# Patient Record
Sex: MALE | Race: BLACK OR AFRICAN AMERICAN | Employment: OTHER | URBAN - METROPOLITAN AREA
[De-identification: names, ages, dates, MRNs, and addresses within clinical notes are randomized per-mention and may not be internally consistent; named-entity substitution may affect disease eponyms.]

---

## 2019-05-01 ENCOUNTER — HOSPITAL ENCOUNTER (EMERGENCY)
Age: 74
Discharge: HOME | End: 2019-05-01
Attending: EMERGENCY MEDICINE
Payer: MEDICARE

## 2019-05-01 VITALS
WEIGHT: 179.9 LBS | TEMPERATURE: 97.8 F | OXYGEN SATURATION: 100 % | HEIGHT: 72 IN | BODY MASS INDEX: 24.37 KG/M2 | HEART RATE: 93 BPM | SYSTOLIC BLOOD PRESSURE: 87 MMHG | RESPIRATION RATE: 12 BRPM | DIASTOLIC BLOOD PRESSURE: 57 MMHG

## 2019-05-01 DIAGNOSIS — T82.524A DISPLACEMENT OF PERIPHERALLY INSERTED CENTRAL CATHETER (PICC) (HCC): Primary | ICD-10-CM

## 2019-05-01 DIAGNOSIS — Z51.5 HOSPICE CARE PATIENT: ICD-10-CM

## 2019-05-01 DIAGNOSIS — T82.838A BLEEDING FROM PERIPHERALLY INSERTED CENTRAL VENOUS CATHETER (PICC), INITIAL ENCOUNTER: ICD-10-CM

## 2019-05-01 LAB
ALBUMIN SERPL-MCNC: 2.5 G/DL (ref 3.5–5)
ALBUMIN/GLOB SERPL: 0.6 {RATIO} (ref 1.1–2.2)
ALP SERPL-CCNC: 190 U/L (ref 45–117)
ALT SERPL-CCNC: 8 U/L (ref 12–78)
ANION GAP SERPL CALC-SCNC: 14 MMOL/L (ref 5–15)
AST SERPL-CCNC: 31 U/L (ref 15–37)
BASOPHILS # BLD: 0 K/UL (ref 0–0.1)
BASOPHILS NFR BLD: 0 % (ref 0–1)
BILIRUB SERPL-MCNC: 1.4 MG/DL (ref 0.2–1)
BUN SERPL-MCNC: 149 MG/DL (ref 6–20)
BUN/CREAT SERPL: 38 (ref 12–20)
CALCIUM SERPL-MCNC: 8.6 MG/DL (ref 8.5–10.1)
CHLORIDE SERPL-SCNC: 106 MMOL/L (ref 97–108)
CO2 SERPL-SCNC: 23 MMOL/L (ref 21–32)
CREAT SERPL-MCNC: 3.88 MG/DL (ref 0.7–1.3)
DIFFERENTIAL METHOD BLD: ABNORMAL
EOSINOPHIL # BLD: 0 K/UL (ref 0–0.4)
EOSINOPHIL NFR BLD: 0 % (ref 0–7)
ERYTHROCYTE [DISTWIDTH] IN BLOOD BY AUTOMATED COUNT: 17.6 % (ref 11.5–14.5)
GLOBULIN SER CALC-MCNC: 3.9 G/DL (ref 2–4)
GLUCOSE SERPL-MCNC: 98 MG/DL (ref 65–100)
HCT VFR BLD AUTO: 23.4 % (ref 36.6–50.3)
HGB BLD-MCNC: 7.7 G/DL (ref 12.1–17)
IMM GRANULOCYTES # BLD AUTO: 0.1 K/UL (ref 0–0.04)
IMM GRANULOCYTES NFR BLD AUTO: 1 % (ref 0–0.5)
LYMPHOCYTES # BLD: 0.3 K/UL (ref 0.8–3.5)
LYMPHOCYTES NFR BLD: 3 % (ref 12–49)
MCH RBC QN AUTO: 31.3 PG (ref 26–34)
MCHC RBC AUTO-ENTMCNC: 32.9 G/DL (ref 30–36.5)
MCV RBC AUTO: 95.1 FL (ref 80–99)
MONOCYTES # BLD: 1.1 K/UL (ref 0–1)
MONOCYTES NFR BLD: 13 % (ref 5–13)
NEUTS SEG # BLD: 7.1 K/UL (ref 1.8–8)
NEUTS SEG NFR BLD: 83 % (ref 32–75)
NRBC # BLD: 0 K/UL (ref 0–0.01)
NRBC BLD-RTO: 0 PER 100 WBC
PLATELET # BLD AUTO: 146 K/UL (ref 150–400)
PMV BLD AUTO: 10.7 FL (ref 8.9–12.9)
POTASSIUM SERPL-SCNC: 2.7 MMOL/L (ref 3.5–5.1)
PROT SERPL-MCNC: 6.4 G/DL (ref 6.4–8.2)
RBC # BLD AUTO: 2.46 M/UL (ref 4.1–5.7)
RBC MORPH BLD: ABNORMAL
SODIUM SERPL-SCNC: 143 MMOL/L (ref 136–145)
WBC # BLD AUTO: 8.6 K/UL (ref 4.1–11.1)

## 2019-05-01 PROCEDURE — 99285 EMERGENCY DEPT VISIT HI MDM: CPT

## 2019-05-01 PROCEDURE — 80053 COMPREHEN METABOLIC PANEL: CPT

## 2019-05-01 PROCEDURE — 85025 COMPLETE CBC W/AUTO DIFF WBC: CPT

## 2019-05-01 PROCEDURE — 36415 COLL VENOUS BLD VENIPUNCTURE: CPT

## 2019-05-01 NOTE — ED PROVIDER NOTES
EMERGENCY DEPARTMENT HISTORY AND PHYSICAL EXAM 
 
 
Date: 5/1/2019 Patient Name: Myke Kenyon History of Presenting Illness Chief Complaint Patient presents with  
 Other  
  pt pulled PICC line at home; pt on high dose dobutamine drip and on hospice; pt is DNR; was supposed to stop dobutamine drip today but wife wants to continue drip; hx ESRD, end stage CHF, gout, sacral pressure ulcer; hx heart transplant 17 yrs ago; PICC site has been bleeding since 1400 today; per EMS, pt lost several hundred mL's blood History Provided By: Patient's Wife and Hospice nurse HPI: Myke Kenyon, 69 y.o. male [place] patient with PMHx significant for end-stage heart failure and renal disease presents to the ED with bleeding from his PICC line. Patient was recently admitted at the [facility] and discharged with a right upper arm PICC line in place with dobutamine infusing. He is a hospice patient and on morphine and Ativan for comfort care. He has a durable DNR. At some time during the day wife noticed that his PICC line was bleeding and that his dobutamine was no longer infusing. She contacted the hospice nurse who rewrapped his PICC line dressing but did not take it down to see what the problem was. Patient is nonverbal and has no complaints. Per the hospice nurse patient was supposed to be taken off of dobutamine today. He is to be comfort care only. However when the hospice nurse discussed this with the wife she initially stated that she did not want him to be comfort care only and wanted his dobutamine to be continued. There are no other complaints, changes, or physical findings at this time. PCP: Brea Zelaya MD 
 
No current facility-administered medications on file prior to encounter. No current outpatient medications on file prior to encounter. Past History Past Medical History: No past medical history on file. Past Surgical History: No past surgical history on file. Family History: No family history on file. Social History: 
Social History Tobacco Use  Smoking status: Not on file Substance Use Topics  Alcohol use: Not on file  Drug use: Not on file Allergies: Allergies Allergen Reactions  Lisinopril Nausea Only  Losartan Itching Review of Systems Review of Systems Unable to perform ROS: Patient nonverbal  
 
 
 
Physical Exam  
General appearance - well nourished, chronically ill appearing, grimacing  
eyes - pupils equal and reactive, extraocular eye movements intact ENT - mucous membranes dry with dried food on chin, pharynx normal without lesions Neck - supple, no significant adenopathy; non-tender to palpation Chest - clear to auscultation, no wheezes, rales or rhonchi; non-tender to palpation Heart - normal rate and regular rhythm, S1 and S2 normal, no murmurs noted Abdomen - soft,nondistended, no masses or organomegaly Musculoskeletal - no joint tenderness, deformity or swelling; normal ROM Extremities - peripheral pulses normal, no pedal edema right arm with dressing overlying site of the PICC line. When dressing was taken down it was found that PICC line was completely out of the skin. Skin - normal coloration and turgor, no rashes Neurological -opens eyes, nonverbal, does not follow commands no focal findings or movement disorder noted Diagnostic Study Results Labs - Recent Results (from the past 12 hour(s)) CBC WITH AUTOMATED DIFF Collection Time: 05/01/19  1:55 AM  
Result Value Ref Range WBC 8.6 4.1 - 11.1 K/uL  
 RBC 2.46 (L) 4.10 - 5.70 M/uL HGB 7.7 (L) 12.1 - 17.0 g/dL HCT 23.4 (L) 36.6 - 50.3 % MCV 95.1 80.0 - 99.0 FL  
 MCH 31.3 26.0 - 34.0 PG  
 MCHC 32.9 30.0 - 36.5 g/dL  
 RDW 17.6 (H) 11.5 - 14.5 % PLATELET 361 (L) 877 - 400 K/uL MPV 10.7 8.9 - 12.9 FL  
 NRBC 0.0 0  WBC ABSOLUTE NRBC 0.00 0.00 - 0.01 K/uL NEUTROPHILS 83 (H) 32 - 75 % LYMPHOCYTES 3 (L) 12 - 49 % MONOCYTES 13 5 - 13 % EOSINOPHILS 0 0 - 7 % BASOPHILS 0 0 - 1 % IMMATURE GRANULOCYTES 1 (H) 0.0 - 0.5 % ABS. NEUTROPHILS 7.1 1.8 - 8.0 K/UL  
 ABS. LYMPHOCYTES 0.3 (L) 0.8 - 3.5 K/UL  
 ABS. MONOCYTES 1.1 (H) 0.0 - 1.0 K/UL  
 ABS. EOSINOPHILS 0.0 0.0 - 0.4 K/UL  
 ABS. BASOPHILS 0.0 0.0 - 0.1 K/UL  
 ABS. IMM. GRANS. 0.1 (H) 0.00 - 0.04 K/UL  
 DF AUTOMATED    
 RBC COMMENTS ANISOCYTOSIS 1+ 
    
 RBC COMMENTS ROULEAUX PRESENT 
    
 RBC COMMENTS MACROCYTOSIS 
1+ METABOLIC PANEL, COMPREHENSIVE Collection Time: 05/01/19  1:55 AM  
Result Value Ref Range Sodium 143 136 - 145 mmol/L Potassium 2.7 (LL) 3.5 - 5.1 mmol/L Chloride 106 97 - 108 mmol/L  
 CO2 23 21 - 32 mmol/L Anion gap 14 5 - 15 mmol/L Glucose 98 65 - 100 mg/dL  (H) 6 - 20 MG/DL Creatinine 3.88 (H) 0.70 - 1.30 MG/DL  
 BUN/Creatinine ratio 38 (H) 12 - 20 GFR est AA 19 (L) >60 ml/min/1.73m2 GFR est non-AA 15 (L) >60 ml/min/1.73m2 Calcium 8.6 8.5 - 10.1 MG/DL Bilirubin, total 1.4 (H) 0.2 - 1.0 MG/DL  
 ALT (SGPT) 8 (L) 12 - 78 U/L  
 AST (SGOT) 31 15 - 37 U/L Alk. phosphatase 190 (H) 45 - 117 U/L Protein, total 6.4 6.4 - 8.2 g/dL Albumin 2.5 (L) 3.5 - 5.0 g/dL Globulin 3.9 2.0 - 4.0 g/dL A-G Ratio 0.6 (L) 1.1 - 2.2 Radiologic Studies - No orders to display CT Results  (Last 48 hours) None CXR Results  (Last 48 hours) None Medical Decision Making I am the first provider for this patient. I reviewed the vital signs, available nursing notes, past medical history, past surgical history, family history and social history. Vital Signs-Reviewed the patient's vital signs. Patient Vitals for the past 12 hrs: 
 Temp Pulse Resp BP SpO2  
05/01/19 0250  78 12    
05/01/19 0210  83 13  100 % 05/01/19 0205  86 16  100 % 05/01/19 0200  83 18 (!) 80/58 99 % 05/01/19 0143 97.7 °F (36.5 °C) 83 24 (!) 74/61 99 % Records Reviewed: Nursing Notes and Old Medical Records Provider Notes (Medical Decision Making):  
29-year-old presents with displaced right PICC line through which she is receiving dobutamine infusion. He arrives hypotensive. The wife initially stated that she wanted the dobutamine to be continued despite the orders for it to be DC'd today. At the same time she was asking for morphine and Ativan to help keep him comfortable. I discussed with her that patient's blood pressure  was not high enough for him to tolerate morphine and Ativan at this time. We discussed the implications of comfort care and she is in agreement that she does not want heroic life-saving measures, but would prefer to take the patient home so that he can be in a familiar place, and to treat his pain and agitation with hospice medications understanding that his blood pressure may be dangerously low and that he may die. The wife seems to understand and is comfortable taking him home so that he can die in his own home and be comfortable. ED Course:  
Initial assessment performed. The patients presenting problems have been discussed, and they are in agreement with the care plan formulated and outlined with them. I have encouraged them to ask questions as they arise throughout their visit. Disposition: 
DC home PLAN: 
1. There are no discharge medications for this patient. 2.  
Follow-up Information Follow up With Specialties Details Why Contact Info  
    follow up with your hospice nurse Return to ED if worse Diagnosis Clinical Impression: 1. Displacement of peripherally inserted central catheter (PICC) ([facility] [address] 2. Bleeding from peripherally inserted central venous catheter (PICC), initial encounter ([address] 3. Hospice care patient

## 2019-05-01 NOTE — ED NOTES
Pepper KEENAN ordered to cancel all of the blood work being processed - Spoke with lab to cancel all blood work - will continue to monitor.

## 2019-05-01 NOTE — ED NOTES
Pt arrived via EMS. Pt has hx of heart transplant 17 yrs ago, ESRD, end stage CHF. Pt is DNR and on dobutamine drip at home. Pt possibly pulled PICC line. Site has been bleeding since 1400 today and not had the dobutamine drip.

## 2019-05-01 NOTE — ED NOTES
[facility] notified of pt need to be transferred home regarding hospice care. Wait will be 60-90 minutes prior to arrival.  Wife made aware.

## 2019-05-01 NOTE — ED NOTES
Discharged by provider. Home via [facility] ambulance transport at this time. Wife at bedside.

## 2019-05-01 NOTE — ED NOTES
Per Dr. Daquan Hinojosa patient to be comfort care patient to use home hospice medications per Dr. Daquan Hinojosa.

## 2019-05-01 NOTE — DISCHARGE INSTRUCTIONS
Patient Education        Learning About Hospice and Palliative Care  What are hospice and palliative care? Palliative (say \"PAL-sandi-uh-tiv\") care is an area of medicine that helps give you more good days by providing care for quality-of-life issues. It includes treating symptoms like pain, nausea, or sleep problems. It can also include helping you and your loved ones to:  · Understand your illness better. · Talk more openly about your feelings. · Decide what treatments you want or don't want. · Communicate better with your doctors, nurses, and each other. Hospice care is a type of palliative care. But it's for people who are near the end of life. What kinds of care are involved? Palliative care: This treatment helps you feel better physically, emotionally, and spiritually while doctors also treat your illness. Your care may include pain relief, counseling, or nutrition advice. Hospice care: Again, the goal of this type of care is to help you feel better. And it can help you get the most out of the time you have left. But you no longer get treatment to try to cure your illness. When does care happen? Palliative care: This care can happen at any time during a serious illness. You don't have to be near death to get this care. Hospice care: In most cases, you can choose hospice care when your doctor believes that you have no more than about 6 months to live. Where does the care happen? Palliative care: This care often happens in hospitals or long-term care facilities like nursing homes. It can take place wherever you are treated, even in your home. Hospice care: Most hospice care is done in the place the patient calls \"home. \" This is often the person's home. But it could also be a place like a nursing home or half-way center. Hospice care may also be given in hospice centers, hospitals, and other places. Who provides the care? Palliative care:  There are doctors and nurses who specialize in this field. But your own doctor may also give some of this care. And there are many other experts who may help you. These include social workers, counselors, therapists, and nutrition experts. Hospice care: In hospitals, hospice centers, and other facilities, care is given by doctors, nurses, and others who are trained in hospice care. In the home, a family member is often the main caregiver. But the family member gets help from care experts. They are on call 24 hours a day. Where can you learn more? Go to http://frankie-esteban.info/. Enter [id number] in the search box to learn more about \"Learning About Hospice and Palliative Care. \"  Current as of: April 18, 2018  Content Version: 11.9  © 9564-7468 [employer]. Care instructions adapted under license by [employer] (which disclaims liability or warranty for this information). If you have questions about a medical condition or this instruction, always ask your healthcare professional. [place] [address] any warranty or liability for your use of this information.

## 2019-05-01 NOTE — ED NOTES
Pepper KEENAN at bedside speaking with the pt's spouse and Hospice RN - plan of care, pt will be CMO -

## 2019-05-01 NOTE — ED NOTES
Right upper extremity dressing removed - PICC line was noted to be removed while the pt was at home - dobutamine pump was turned off - site was cleaned and cleansed - applied 4x4 to the site - wrapped with pressure dressing; will continue to monitor;

## 2019-05-01 NOTE — ED NOTES
Patient awaiting [facility] transport service for transport back home. Wife with patient.